# Patient Record
Sex: FEMALE | Race: BLACK OR AFRICAN AMERICAN | Employment: FULL TIME | URBAN - METROPOLITAN AREA
[De-identification: names, ages, dates, MRNs, and addresses within clinical notes are randomized per-mention and may not be internally consistent; named-entity substitution may affect disease eponyms.]

---

## 2019-12-30 ENCOUNTER — HOSPITAL ENCOUNTER (EMERGENCY)
Age: 44
Discharge: HOME | End: 2019-12-30
Attending: EMERGENCY MEDICINE
Payer: COMMERCIAL

## 2019-12-30 VITALS
HEART RATE: 84 BPM | RESPIRATION RATE: 16 BRPM | WEIGHT: 254 LBS | TEMPERATURE: 97.9 F | SYSTOLIC BLOOD PRESSURE: 142 MMHG | DIASTOLIC BLOOD PRESSURE: 85 MMHG | OXYGEN SATURATION: 98 % | HEIGHT: 67 IN | BODY MASS INDEX: 39.87 KG/M2

## 2019-12-30 DIAGNOSIS — H10.31 ACUTE CONJUNCTIVITIS OF RIGHT EYE, UNSPECIFIED ACUTE CONJUNCTIVITIS TYPE: Primary | ICD-10-CM

## 2019-12-30 PROCEDURE — 99282 EMERGENCY DEPT VISIT SF MDM: CPT

## 2019-12-30 RX ORDER — ERYTHROMYCIN 5 MG/G
OINTMENT OPHTHALMIC
Qty: 3.5 G | Refills: 0 | Status: SHIPPED | OUTPATIENT
Start: 2019-12-30 | End: 2021-03-23

## 2019-12-30 NOTE — ED PROVIDER NOTES
Auston Delude [facility] EMERGENCY DEPT    9:19 AM    Date: 12/30/2019  Patient Name: General Robert    History of Presenting Illness     No chief complaint on file. 38 y.o. female with noted past medical history who presents to the emergency department with right eye redness and irritation. The patient states she was in her usual state of health until 1 week ago when she started having some right eye redness and irritation. She states that she not driving about until yesterday when she went to [facility] emergency department and was prescribed an eyedrop antibiotic. She states is not any better and comes back to the ER for evaluation and treatment. .  She has had discharge matting her eye at times when she wakes up over the last 2 days. She denies any foreign body sensation. No fever or chills. No trauma to the eye. No other complaints. No arc welding, hammering or grinding metal exposure. No change in vision. Patient denies any other associated signs or symptoms. Patient denies any other complaints. Nursing notes regarding the HPI and triage nursing notes were reviewed. Prior medical records were reviewed. Past History     Past Medical History:  No past medical history on file. Past Surgical History:  No past surgical history on file. Family History:  No family history on file. Social History:  Social History     Tobacco Use    Smoking status: Not on file   Substance Use Topics    Alcohol use: Not on file    Drug use: Not on file       Allergies:  Not on File    Patient's primary care provider (as noted in EPIC): Sharon Vieira MD    Review of Systems   Constitutional: Negative for diaphoresis. HENT: Negative for congestion. Eyes: Positive for pain, discharge and redness. Negative for visual disturbance. Respiratory: Negative for stridor. Cardiovascular: Negative for palpitations. Gastrointestinal: Negative for diarrhea.    Endocrine: Negative for heat intolerance. Genitourinary: Negative for flank pain. Musculoskeletal: Negative for back pain. Neurological: Negative for weakness. Psychiatric/Behavioral: Negative for hallucinations. All other systems reviewed and are negative. Visit Vitals  /85 (BP 1 Location: Left arm, BP Patient Position: Sitting)   Pulse 84   Temp 97.9 °F (36.6 °C)   Resp 16   Ht 5' 7\" (1.702 m)   Wt 115.2 kg (254 lb)   SpO2 98%   BMI 39.78 kg/m²       PHYSICAL EXAM:    EYES:      Right eye: EOMI. Non-icteric sclera. ERYTHEMATOUS conjunctiva. No foreign bodies noted. No fluorescein uptake noted. Noted visual acuity in triage. There are no signs of cellulitis nor periorbital cellulitis. ENTM:  Nose:  no rhinorrhea. Throat:  no erythema or exudate, mucous membranes moist.  NECK:  No JVD. Supple  RESPIRATORY:  Chest clear, equal breath sounds, good air movement. CARDIOVASCULAR:  Regular rate and rhythm. No murmurs, rubs, or gallops. GI:  Normal bowel sounds, abdomen soft and non-tender. No rebound or guarding. BACK:  Non-tender. UPPER EXT:  Normal inspection. LOWER EXT:  No edema, no calf tenderness. Distal pulses intact. NEURO:  Moves all four extremities, and grossly normal motor exam.  SKIN:  No rashes;  Normal for age. PSYCH:  Alert and normal affect. DIFFERENTIAL DIAGNOSES/ MEDICAL DECISION MAKING:   Eye redness from conjunctivitis, viral or bacterial, abrasion, chemical or thermal exposure, arc welding/flash burns to cornea, foreign bodies, other lesser etiologies. Diagnostic Study Results     Abnormal lab results from this emergency department encounter:  Labs Reviewed - No data to display    Lab values for this patient within approximately the last 12 hours:  No results found for this or any previous visit (from the past 12 hour(s)). Radiologist and cardiologist interpretations if available at time of this note:  No results found.     Medication(s) ordered for patient during this emergency visit encounter:  Medications - No data to display    Medical Decision Making     I am the first provider for this patient. I reviewed the vital signs, available nursing notes, past medical history, past surgical history, family history and social history. Vital Signs:  Reviewed the patient's vital signs. IMPRESSION AND MEDICAL DECISION MAKING:  Based upon the patient's presentation with noted HPI and PE, along with the work up done in the emergency department, I believe that the patient is having noted conjunctivitis. DIAGNOSIS:  1. Conjunctivitis, right. SPECIFIC PATIENT INSTRUCTIONS FROM THE PHYSICIAN WHO TREATED YOU IN THE ER TODAY:  1. Return if any concerns or worsening of condition(s)  2. Erythromycin eye ointment as prescribed. 3. FOLLOW UP APPOINTMENT:  Your ophthalmologist or the one listed in these discharge instructions in the next week. Patient is improved, resting quietly and comfortably. The patient will be discharged home. The patient was reassured that these symptoms do not appear to represent a serious or life threatening condition at this time. Warning signs of worsening condition were discussed and understood by the patient. Based on patient's age, coexisting illness, exam, and the results of this ED evaluation, the decision to treat as an outpatient was made. Based on the information available at time of discharge, acute pathology requiring immediate intervention was deemed relative unlikely. While it is impossible to completely exclude the possibility of underlying serious disease or worsening of condition, I feel the relative likelihood is extremely low. I discussed this uncertainty with the patient, who understood ED evaluation and treatment and felt comfortable with the outpatient treatment plan. All questions regarding care, test results, and follow up were answered. The patient is stable and appropriate to discharge.  They understand that they should return to the emergency department for any new or worsening symptoms. I stressed the importance of follow up for repeat assessment and possibly further evaluation/treatment. Dictation disclaimer:  Please note that this dictation was completed with [employer], the computer voice recognition software. Quite often unanticipated grammatical, syntax, homophones, and other interpretive errors are inadvertently transcribed by the computer software. Please disregard these errors. Please excuse any errors that have escaped final proofreading. Coding Diagnoses     Clinical Impression:   1. Acute conjunctivitis of right eye, unspecified acute conjunctivitis type        Disposition     Disposition: Discharge. Dewanda Opitz L. Evetta Dingwall, M.D. ABEM Board Certified Emergency Physician    Provider Attestation:  If a scribe was utilized in generation of this patient record, I personally performed the services described in the documentation, reviewed the documentation, as recorded by the scribe in my presence, and it accurately records the patient's history of presenting illness, review of systems, patient physical examination, and procedures performed by me as the attending physician. Dewanda Opitz L. Evetta Dingwall, M.D. ABEM Board Certified Emergency Physician  12/30/2019.  9:25 AM

## 2019-12-30 NOTE — LETTER
NOTIFICATION RETURN TO WORK / SCHOOL 
 
12/30/2019 10:35 AM 
 
Ms. General Robert [address] [facility] [address] To Whom It May Concern: General Robert is currently under the care of [facility] EMERGENCY DEPT. She will return to work/school on: 1/2/2020 to full duty If there are questions or concerns please have the patient contact our office.  
 
 
 
Sincerely,

## 2019-12-30 NOTE — DISCHARGE INSTRUCTIONS
SPECIFIC PATIENT INSTRUCTIONS FROM THE PHYSICIAN WHO TREATED YOU IN THE ER TODAY:  1. Return if any concerns or worsening of condition(s)  2. Erythromycin eye ointment as prescribed. 3. FOLLOW UP APPOINTMENT:  Your ophthalmologist or the one listed in these discharge instructions in the next week. Patient Education        Pinkeye: Care Instructions  Your Care Instructions    Pinkeye is redness and swelling of the eye surface and the conjunctiva (the lining of the eyelid and the covering of the white part of the eye). Pinkeye is also called conjunctivitis. Pinkeye is often caused by infection with bacteria or a virus. Dry air, allergies, smoke, and chemicals are other common causes. Pinkeye often clears on its own in 7 to 10 days. Antibiotics only help if the pinkeye is caused by bacteria. Pinkeye caused by infection spreads easily. If an allergy or chemical is causing pinkeye, it will not go away unless you can avoid whatever is causing it. Follow-up care is a key part of your treatment and safety. Be sure to make and go to all appointments, and call your doctor if you are having problems. It's also a good idea to know your test results and keep a list of the medicines you take. How can you care for yourself at home? · Wash your hands often. Always wash them before and after you treat pinkeye or touch your eyes or face. · Use moist cotton or a clean, wet cloth to remove crust. Wipe from the inside corner of the eye to the outside. Use a clean part of the cloth for each wipe. · Put cold or warm wet cloths on your eye a few times a day if the eye hurts. · Do not wear contact lenses or eye makeup until the pinkeye is gone. Throw away any eye makeup you were using when you got pinkeye. Clean your contacts and storage case. If you wear disposable contacts, use a new pair when your eye has cleared and it is safe to wear contacts again.   · If the doctor gave you antibiotic ointment or eyedrops, use them as directed. Use the medicine for as long as instructed, even if your eye starts looking better soon. Keep the bottle tip clean, and do not let it touch the eye area. · To put in eyedrops or ointment:  ? Tilt your head back, and pull your lower eyelid down with one finger. ? Drop or squirt the medicine inside the lower lid. ? Close your eye for 30 to 60 seconds to let the drops or ointment move around. ? Do not touch the ointment or dropper tip to your eyelashes or any other surface. · Do not share towels, pillows, or washcloths while you have pinkeye. When should you call for help? Call your doctor now or seek immediate medical care if:    · You have pain in your eye, not just irritation on the surface.     · You have a change in vision or loss of vision.     · You have an increase in discharge from the eye.     · Your eye has not started to improve or begins to get worse within 48 hours after you start using antibiotics.     · Pinkeye lasts longer than 7 days.    Watch closely for changes in your health, and be sure to contact your doctor if you have any problems. Where can you learn more? Go to http://yumiko-prince.info/. Enter Y392 in the search box to learn more about \"Pinkeye: Care Instructions. \"  Current as of: June 26, 2019  Content Version: 12.2  © 5990-5562 Healthwise, Incorporated. Care instructions adapted under license by [employer] (which disclaims liability or warranty for this information). If you have questions about a medical condition or this instruction, always ask your healthcare professional. [address] any warranty or liability for your use of this information. NeuroSave Activation    Thank you for requesting access to NeuroSave. Please follow the instructions below to securely access and download your online medical record.  NeuroSave allows you to send messages to your doctor, view your test results, renew your prescriptions, schedule appointments, and more. How Do I Sign Up? In your internet browser, go to [email or web address]. Click on the First Time User? Click Here link in the Sign In box. You will see the New Member Sign Up page. Enter your PrivacyCentral Access Code exactly as it appears below. You will not need to use this code after you´ve completed the sign-up process. If you do not sign up before the expiration date, you must request a new code. PrivacyCentral Access Code: [email or web address]  Expires: 3/28/2019  2:27 PM (This is the date your PrivacyCentral access code will )    Enter the last four digits of your Social Security Number (xxxx) and Date of Birth (mm/dd/yyyy) as indicated and click Submit. You will be taken to the next sign-up page. Create a PrivacyCentral ID. This will be your PrivacyCentral login ID and cannot be changed, so think of one that is secure and easy to remember. Create a PrivacyCentral password. You can change your password at any time. Enter your Password Reset Question and Answer. This can be used at a later time if you forget your password. Enter your e-mail address. You will receive e-mail notification when new information is available in [address] Click Sign Up. You can now view and download portions of your medical record. Click the [employer] link to download a portable copy of your medical information. Additional Information    If you have questions, please visit the Frequently Asked Questions section of the PrivacyCentral website at [email or web address] Remember, PrivacyCentral is NOT to be used for urgent needs. For medical emergencies, dial 911.

## 2020-08-02 ENCOUNTER — HOSPITAL ENCOUNTER (EMERGENCY)
Age: 45
Discharge: HOME | End: 2020-08-02
Attending: EMERGENCY MEDICINE
Payer: COMMERCIAL

## 2020-08-02 VITALS
HEIGHT: 67 IN | TEMPERATURE: 98 F | RESPIRATION RATE: 18 BRPM | HEART RATE: 77 BPM | DIASTOLIC BLOOD PRESSURE: 83 MMHG | BODY MASS INDEX: 40.18 KG/M2 | SYSTOLIC BLOOD PRESSURE: 134 MMHG | WEIGHT: 256 LBS | OXYGEN SATURATION: 100 %

## 2020-08-02 DIAGNOSIS — L03.90 CELLULITIS, UNSPECIFIED CELLULITIS SITE: Primary | ICD-10-CM

## 2020-08-02 PROCEDURE — 74011250637 HC RX REV CODE- 250/637: Performed by: EMERGENCY MEDICINE

## 2020-08-02 PROCEDURE — 99283 EMERGENCY DEPT VISIT LOW MDM: CPT

## 2020-08-02 RX ORDER — CEPHALEXIN 500 MG/1
500 CAPSULE ORAL 4 TIMES DAILY
Qty: 28 CAP | Refills: 0 | Status: SHIPPED | OUTPATIENT
Start: 2020-08-02 | End: 2020-08-09

## 2020-08-02 RX ORDER — SULFAMETHOXAZOLE AND TRIMETHOPRIM 800; 160 MG/1; MG/1
2 TABLET ORAL
Status: COMPLETED | OUTPATIENT
Start: 2020-08-02 | End: 2020-08-02

## 2020-08-02 RX ORDER — INSULIN LISPRO 100 [IU]/ML
INJECTION, SOLUTION INTRAVENOUS; SUBCUTANEOUS
COMMUNITY
End: 2021-03-23

## 2020-08-02 RX ORDER — SULFAMETHOXAZOLE AND TRIMETHOPRIM 800; 160 MG/1; MG/1
2 TABLET ORAL 2 TIMES DAILY
Qty: 28 TAB | Refills: 0 | Status: SHIPPED | OUTPATIENT
Start: 2020-08-02 | End: 2020-08-09

## 2020-08-02 RX ORDER — CEPHALEXIN 250 MG/1
500 CAPSULE ORAL
Status: COMPLETED | OUTPATIENT
Start: 2020-08-02 | End: 2020-08-02

## 2020-08-02 RX ORDER — ATORVASTATIN CALCIUM 40 MG/1
20 TABLET, FILM COATED ORAL DAILY
COMMUNITY
End: 2021-03-23

## 2020-08-02 RX ADMIN — SULFAMETHOXAZOLE AND TRIMETHOPRIM 2 TABLET: 800; 160 TABLET ORAL at 04:40

## 2020-08-02 RX ADMIN — CEPHALEXIN 500 MG: 250 CAPSULE ORAL at 04:40

## 2020-08-02 NOTE — DISCHARGE INSTRUCTIONS

## 2020-08-02 NOTE — ED TRIAGE NOTES
Pt. Complains of a suprapubic abscess to her groin for the past week, pt reports using hot clothes with no relief.

## 2021-03-24 PROBLEM — Z12.11 SCREENING FOR COLON CANCER: Status: ACTIVE | Noted: 2021-03-24

## 2021-04-23 PROBLEM — Z12.11 SCREENING FOR COLON CANCER: Status: RESOLVED | Noted: 2021-03-24 | Resolved: 2021-04-23

## 2021-08-04 PROBLEM — E66.01 MORBID OBESITY (HCC): Status: ACTIVE | Noted: 2021-08-04

## 2025-07-03 NOTE — ED PROVIDER NOTES
Rosio Taylor is a 38 y.o. female with a past medical history of diabetes coming in complaining of a painful boil in her suprapubic region for the last week. She states that it was larger and she was using warm compresses and did squeeze the area. She states there was some purulent drainage, but that resolved. She states she still having sharp, constant, non-rating pain in this area. She denies any vaginal discharge. States she has been having regular menstrual cycles and just came off her cycle. Denies any dysuria. Denies any fevers or chills. States she has never had these before, but her sister told her to use warm compresses. No other complaints at this time. Past Medical History:   Diagnosis Date    Diabetes [facility])        History reviewed. No pertinent surgical history. History reviewed. No pertinent family history.     Social History     Socioeconomic History    Marital status:      Spouse name: Not on file    Number of children: Not on file    Years of education: Not on file    Highest education level: Not on file   Occupational History    Not on file   Social Needs    Financial resource strain: Not on file    Food insecurity     Worry: Not on file     Inability: Not on file    Transportation needs     Medical: Not on file     Non-medical: Not on file   Tobacco Use    Smoking status: Never Smoker    Smokeless tobacco: Never Used   Substance and Sexual Activity    Alcohol use: Never     Frequency: Never    Drug use: Never    Sexual activity: Not on file   Lifestyle    Physical activity     Days per week: Not on file     Minutes per session: Not on file    Stress: Not on file   Relationships    Social connections     Talks on phone: Not on file     Gets together: Not on file     Attends Taoism service: Not on file     Active member of club or organization: Not on file     Attends meetings of clubs or organizations: Not on file     Relationship status: Not on file    Intimate partner violence     Fear of current or ex partner: Not on file     Emotionally abused: Not on file     Physically abused: Not on file     Forced sexual activity: Not on file   Other Topics Concern    Not on file   Social History Narrative    Not on file         ALLERGIES: Patient has no known allergies. Review of Systems   Constitutional: Negative. Negative for chills and fever. Respiratory: Negative. Negative for shortness of breath. Cardiovascular: Negative. Negative for chest pain. Gastrointestinal: Negative. Negative for nausea and vomiting. Genitourinary: Negative. Negative for dysuria, menstrual problem, pelvic pain, vaginal bleeding and vaginal discharge. Musculoskeletal: Negative. Negative for myalgias. Skin: Positive for rash and wound. Neurological: Negative. Negative for dizziness, weakness and light-headedness. All other systems reviewed and are negative. Vitals:    08/02/20 0158   BP: 134/83   Pulse: 77   Resp: 18   Temp: 98 °F (36.7 °C)   SpO2: 100%   Weight: 116.1 kg (256 lb)   Height: 5' 7\" (1.702 m)            Physical Exam  Vitals signs reviewed. Constitutional:       General: She is not in acute distress. Appearance: Normal appearance. She is well-developed. HENT:      Head: Normocephalic and atraumatic. Eyes:      Extraocular Movements: Extraocular movements intact. Conjunctiva/sclera: Conjunctivae normal.      Pupils: Pupils are equal, round, and reactive to light. Neck:      Musculoskeletal: Normal range of motion and neck supple. Cardiovascular:      Rate and Rhythm: Normal rate and regular rhythm. Heart sounds: S1 normal and S2 normal.   Pulmonary:      Effort: Pulmonary effort is normal. No accessory muscle usage or respiratory distress. Abdominal:      General: There is no distension. Tenderness: There is no abdominal tenderness. Musculoskeletal: Normal range of motion. General: No tenderness. Skin:     General: Skin is warm. Comments: Patient has a 5 cm x 5 cm area above the mons pubis which is tender, warm, and indurated. There is a central scab which is firm. No palpable fluctuance. Neurological:      General: No focal deficit present. Mental Status: She is alert and oriented to person, place, and time. Psychiatric:         Speech: Speech normal.          MDM  Number of Diagnoses or Management Options  Cellulitis, unspecified cellulitis site:   Diagnosis management comments: Uri Tolentino is a 38 y.o. female coming in complaining of 1 week of a boil which did have some spontaneous drainage. Unclear if this is cellulitis versus central abscess with cellulitis based on the clinical exam.  There is a central scab. Will take a look with the ultrasound to see if there is any drainable abscess. Patient is afebrile and well-appearing. Will start on p.o. antibiotics. Bedside ultrasound shows no evidence of abscess or fluid pocket. Likely residual cellulitis as patient states there was some drainage. Will put patient on antibiotics cover for gram-positive MRSA. Patient was counseled to return if she has recurrent enlarging or fluctuance of this area. She was instructed to continue warm compresses and to follow-up with her primary doctor. She verbalizes understanding and agrees with this plan. Procedures      Vitals:  Patient Vitals for the past 12 hrs:   Temp Pulse Resp BP SpO2   08/02/20 0158 98 °F (36.7 °C) 77 18 134/83 100 %       Medications ordered:   Medications   trimethoprim-sulfamethoxazole (BACTRIM DS, SEPTRA DS) 160-800 mg per tablet 2 Tab (has no administration in time range)   cephALEXin (KEFLEX) capsule 500 mg (has no administration in time range)         Disposition:  Diagnosis:   1.  Cellulitis, unspecified cellulitis site        Disposition: Discharge    Follow-up Information     Follow up With Specialties Details Why Contact Info    Renetta Altman MD Family Medicine Schedule an appointment as soon as possible for a visit for office follow up [address] [facility] [address] [facility] [phone] [id number] [facility] EMERGENCY DEPT Emergency Medicine  As needed, If symptoms worsen [address] [facility]  [phone]           Patient's Medications   Start Taking    CEPHALEXIN (KEFLEX) 500 MG CAPSULE    Take 1 Cap by mouth four (4) times daily for 7 days. TRIMETHOPRIM-SULFAMETHOXAZOLE (BACTRIM DS) 160-800 MG PER TABLET    Take 2 Tabs by mouth two (2) times a day for 7 days. Continue Taking    ATORVASTATIN (LIPITOR) 40 MG TABLET    Take 20 mg by mouth daily. ERYTHROMYCIN (ILOTYCIN) OPHTHALMIC OINTMENT    Apply to affected eye(s) six (6) times a day for 7 days. INSULIN DETEMIR (LEVEMIR U-100 INSULIN SC)    by SubCUTAneous route. INSULIN LISPRO (HUMALOG U-100 INSULIN) 100 UNIT/ML INJECTION    by SubCUTAneous route.    These Medications have changed    No medications on file   Stop Taking    No medications on file Spine appears normal, range of motion is not limited, no muscle or joint tenderness